# Patient Record
Sex: MALE | Race: WHITE
[De-identification: names, ages, dates, MRNs, and addresses within clinical notes are randomized per-mention and may not be internally consistent; named-entity substitution may affect disease eponyms.]

---

## 2019-06-13 ENCOUNTER — HOSPITAL ENCOUNTER (EMERGENCY)
Dept: HOSPITAL 39 - ER | Age: 24
Discharge: HOME | End: 2019-06-13
Payer: COMMERCIAL

## 2019-06-13 VITALS — OXYGEN SATURATION: 99 % | SYSTOLIC BLOOD PRESSURE: 133 MMHG | DIASTOLIC BLOOD PRESSURE: 89 MMHG | TEMPERATURE: 98.1 F

## 2019-06-13 DIAGNOSIS — R21: Primary | ICD-10-CM

## 2019-06-13 DIAGNOSIS — Z87.891: ICD-10-CM

## 2019-06-13 NOTE — ED.PDOC
History of Present Illness





- General


Chief Complaint: Allergic Reaction


Stated Complaint: rash all over body, allergy


Time Seen by Provider: 06/13/19 02:04


Source: patient


Exam Limitations: no limitations





- History of Present Illness


Initial Comments: 





Alok Davidson 22 y/o male stated had itchy skin rash on his arms ,groin and 

legs which came out this evening and took 2 doses of Benadryl at home but stated

continue to itch so he decided to come to ER stated rash got less on arrival at 

ER.Denies using new soaps,lotions or detergent,no recent outdoor activity or any

food allergies,no SOB.Denies history of asthma.


Timing/Duration: 4-6 hours


Severity: moderate


Improving Factors: nothing


Worsening Factors: nothing


Associated Symptoms: other - see hpi


Allergies/Adverse Reactions: 


Allergies





NO KNOWN ALLERGY Allergy (Verified 06/13/19 02:39)


   





Home Medications: 


Ambulatory Orders





predniSONE 20 mg PO DAILY 7 Days #7 tab 06/13/19 











Review of Systems





- Review of Systems


Skin: States: see HPI


All other Systems: Reviewed and Negative, No Change from Baseline





Past Medical History (General)





- Patient Medical History


Hx Stroke: No


Hx Congestive Heart Failure: No


Hx Hypertension: No


Hx Diabetes: No


Hx Cancer: No


Hx Hepatitis C: No


Surgical History: no surgical history





- Vaccination History


Hx Tetanus, Diphtheria Vaccination: Yes


Hx Influenza Vaccination: No


Hx Pneumococcal Vaccination: No





- Social History


Hx Tobacco Use: Yes


Hx Chewing Tobacco Use: Yes


Hx Alcohol Use: Yes





- Female History


Patient Pregnant: No





Family Medical History





- Family History


  ** Mother


Hx Family Hypertension: Yes





Physical Exam





- Physical Exam


General Appearance: Alert, Comfortable, No apparent distress


Eye Exam: bilateral normal


Ears, Nose, Throat: hearing grossly normal, normal ENT inspection, normal 

pharynx


Neck: non-tender, full range of motion, supple, normal inspection


Respiratory: lungs clear, normal breath sounds


Cardiovascular/Chest: normal peripheral pulses, regular rate, rhythm, no murmur


Peripheral Pulses: radial,right: 2+, radial,left: 2+


Gastrointestinal/Abdominal: non tender, soft


Back Exam: normal inspection


Extremity: normal inspection


Neurologic: alert, oriented x 3


Skin Exam: normal color, warm/dry, rash - antecubital and left groin area





Progress





- Progress


Progress: 





06/13/19 02:18


                               Vital Signs - 8 hr











  06/13/19





  01:57


 


Temperature 97.9 F


 


Pulse Rate [ 75





left] 


 


Respiratory 18





Rate 


 


Blood Pressure 132/103





[left] 


 


O2 Sat by Pulse 100





Oximetry 














Departure





- Departure


Clinical Impression: 


 Pruritic rash





Time of Disposition: 02:20


Disposition: Discharge to Home or Self Care


Condition: Fair


Departure Forms:  ED Discharge - Pt. Copy, Patient Portal Self Enrollment


Instructions:  Skin Rash (DC)


Referrals: 


Tu Fuller MD [Primary Care Provider] - 1-2 Weeks


Prescriptions: 


predniSONE 20 mg PO DAILY 7 Days #7 tab


Home Medications: 


Ambulatory Orders





predniSONE 20 mg PO DAILY 7 Days #7 tab 06/13/19 








Additional Instructions: 


Continue with Benadryl capsule 2 capsules 3 x a day as needed for itching;Return

to Emergency room as needed;follow up with primary Md17 June 2019 for recheck as

needed

## 2019-10-09 ENCOUNTER — HOSPITAL ENCOUNTER (OUTPATIENT)
Dept: HOSPITAL 39 - GMAM | Age: 24
End: 2019-10-09
Attending: FAMILY MEDICINE
Payer: COMMERCIAL

## 2019-10-09 DIAGNOSIS — F52.0: Primary | ICD-10-CM

## 2019-10-09 DIAGNOSIS — Z79.899: ICD-10-CM

## 2020-09-02 ENCOUNTER — HOSPITAL ENCOUNTER (OUTPATIENT)
Dept: HOSPITAL 39 - US | Age: 25
End: 2020-09-02
Attending: FAMILY MEDICINE
Payer: COMMERCIAL

## 2020-09-02 DIAGNOSIS — N32.89: ICD-10-CM

## 2020-09-02 DIAGNOSIS — R03.0: Primary | ICD-10-CM

## 2020-09-02 DIAGNOSIS — N28.9: ICD-10-CM

## 2020-09-03 NOTE — US
EXAM DESCRIPTION: 

Renal: Ultrasound.



CLINICAL HISTORY:

24 years Male ELEVATED BLOOD PRESSURE READING W/O DIAGNOSIS OF

HYPERTENSION



COMPARISON: 

None



TECHNIQUE: 

Transcutaneous scanning: Two-dimensional and Doppler modes. 



FINDINGS: 

Right kidney measures 10.8 x 5.8 x 5.2 cm; volume 169.5 ml. 

Mid-renal cortical thickness normal. . Echogenicity equal to the

liver. No hydronephrosis No echogenic stones. Smooth contour of

the kidney with no perinephric fluid. Normal vascularity. 

Proximal ureter not seen..



Left kidney measures 10.8 x 6.1 x 5.7 cm; volume 199.2 ml. 

Mid-renal cortical thickness normal.. Cortical echogenicity.

Heterogeneous; equal to or greater than the liver No

hydronephrosis. No echogenic stones. Smooth contour of the kidney

with no perinephric fluid. Normal vascularity..  Proximal ureter

not seen..



Urinary bladder was  visualized. 6.8 x 4.5 x 4.4 cm: 71.3 mL.

Ureteral jet in the bladder not seen on color Doppler.  Patient

did not void.

Abdominal aorta: 1.4 cm proximal, 1.4 cm mid, and 1.6 cm

distally.



IMPRESSION: 

1. Bilateral kidneys normal size and cortical thickness with no

hydronephrosis or echogenic stones. Cortical echogenicity is

increased bilaterally, with more increased and more heterogeneity

of the cortex in the left kidney. No focal lesions.

2. Minimal distention of the urinary bladder. Abdominal aorta

normal caliber. No ascites.



Electronically signed by:  Jaylen Arthur MD  9/3/2020 8:16 AM CDT

Workstation: [id number]

## 2020-09-08 ENCOUNTER — HOSPITAL ENCOUNTER (OUTPATIENT)
Dept: HOSPITAL 39 - GMAM | Age: 25
End: 2020-09-08
Attending: FAMILY MEDICINE
Payer: COMMERCIAL

## 2020-09-08 ENCOUNTER — HOSPITAL ENCOUNTER (OUTPATIENT)
Dept: HOSPITAL 39 - US | Age: 25
End: 2020-09-08
Attending: FAMILY MEDICINE
Payer: COMMERCIAL

## 2020-09-08 DIAGNOSIS — E29.9: ICD-10-CM

## 2020-09-08 DIAGNOSIS — E83.52: Primary | ICD-10-CM

## 2020-09-09 NOTE — US
EXAM DESCRIPTION: 

Renal Arteries: Ultrasound.



CLINICAL HISTORY: 

ELEVATED BLOOD PRESSURE



COMPARISON: 

Two-dimensional ultrasound evaluation of the bilateral kidneys

September 2.



TECHNIQUE: 

Transcutaneous scanning: Doppler peak systolic and end-diastolic

velocities/measurements of the abdominal aorta, renal arteries,

intra renal arteries, and renal veins. 



FINDINGS: 

PSV (cm/sec): Aorta: 121 Right renal artery: 91 Left renal

artery: 112 

EDV (cm/sec): Right renal artery: 39 Left renal artery: 30 

Renal veins: Generalized IVC: Normal caliber. 

Intrarenal RI's: Superior/Segmental Right: 0.54 Left: 0.58. Mid

segmental Right: 0.52 Left: 0.48. Inferior segmental Right: 0.55

Left: 0.48 

Renal Aortic Ratio: Right RAR = RRA PSV/Aortic PSV = 91 /121=

0.75. Left RAR = LRA PSV/Aortic PSV = 112/121  = 0.93.

End Diastolic Ratio: Right EDR = RRA EDV/RRA PSV = 39/91 = 0.43.

Left EDR = LRA EDV/LRA PSV = 30/112= 0.26.

Other: Mid aortic diameter is 1.3 cm.. 



IMPRESSION: 

1. Bilateral renal aortic ratios are within the normal range; not

indicative of renal artery stenosis. Mid aorta diameter normal.

2. Normal values end diastolic ratios and normal values resistive

indices of the intravascular segmental arteries indicate no

significant renovascular parenchymal disease.



Electronically signed by:  Jaylen Arthur MD  9/9/2020 8:53 AM CDT

Workstation: [id number]

## 2020-10-28 ENCOUNTER — HOSPITAL ENCOUNTER (OUTPATIENT)
Dept: HOSPITAL 39 - MRI | Age: 25
End: 2020-10-28
Attending: FAMILY MEDICINE
Payer: COMMERCIAL

## 2020-10-28 DIAGNOSIS — G93.0: Primary | ICD-10-CM

## 2020-10-28 NOTE — MRI
EXAM DESCRIPTION: 

Brain w/wo Contrast: MRI.



CLINICAL HISTORY: 

NEOPLASM OF UNSPECIF BEHAVIOR OF BRAIN



COMPARISON: 

None.



TECHNIQUE: 

Multiplanar, high-field MRI unit, multiple sequences before and

after 1 mL per 5 kg body weight Dotarem gadolinium IV contrast.

No adverse reactions.



FINDINGS: 

The pituitary gland is normal size and signal. Uniform

enhancement with no areas of non-enhancement or hyper

enhancement. The pituitary infundibulum is in the midline with no

displacement. No suprasellar mass. Normal signal and enhancement

of the optic chiasm, distal optic tracts, and proximal optic

nerves.



Normal FLAIR and T2-weighted signal in the gray matter and white

matter of the cerebral and cerebellar hemispheres. No hemorrhage,

no cerebral edema, no mass-effect. No abnormal contrast

enhancement. Normal noncontrast diffusion images with no evidence

of diffusion restriction.



Cortical sulci, ventricles, and other CSF and subdural spaces are

normally configured No effacement or displacement. No midline

shift. Normal signal in the brainstem and basal ganglia. No

abnormal contrast enhancement.



IACs are unremarkable. No abnormal fluid signal in the mastoid

air cells bilaterally. Normal flow signal void in the major

vessels of the [place], and the venous sinuses. Contour of

the cerebellopontine angles is unremarkable with no abnormal

contrast enhancement.  Base of the cerebellar tonsils is at the

level of the foramen magnum. The paranasal sinuses are

unremarkable.. The bony calvarium is intact. 



IMPRESSION: Normal enhancement of the pituitary gland with no

mass or expansion of the gland. Normal signal and contrast

enhancement infundibulum and optic chiasm. Normal appearance of

the brain before and after gadolinium IV contrast. Normal

noncontrast diffusion imaging of the brain.



Electronically signed by:  Jaylen Arthur MD  10/28/2020 2:51 PM

CDT Workstation: [id number]